# Patient Record
Sex: MALE | Race: WHITE | Employment: FULL TIME | URBAN - METROPOLITAN AREA
[De-identification: names, ages, dates, MRNs, and addresses within clinical notes are randomized per-mention and may not be internally consistent; named-entity substitution may affect disease eponyms.]

---

## 2017-06-05 PROBLEM — K51.30: Status: ACTIVE | Noted: 2017-06-05

## 2018-03-24 ENCOUNTER — APPOINTMENT (OUTPATIENT)
Dept: CT IMAGING | Age: 58
End: 2018-03-24
Attending: EMERGENCY MEDICINE
Payer: COMMERCIAL

## 2018-03-24 ENCOUNTER — HOSPITAL ENCOUNTER (EMERGENCY)
Age: 58
Discharge: HOME | End: 2018-03-24
Attending: EMERGENCY MEDICINE
Payer: COMMERCIAL

## 2018-03-24 VITALS
OXYGEN SATURATION: 99 % | BODY MASS INDEX: 31.1 KG/M2 | HEIGHT: 69 IN | RESPIRATION RATE: 16 BRPM | SYSTOLIC BLOOD PRESSURE: 178 MMHG | WEIGHT: 210 LBS | HEART RATE: 85 BPM | TEMPERATURE: 98 F | DIASTOLIC BLOOD PRESSURE: 100 MMHG

## 2018-03-24 DIAGNOSIS — S30.1XXA ABDOMINAL WALL HEMATOMA, INITIAL ENCOUNTER: Primary | ICD-10-CM

## 2018-03-24 LAB
ALBUMIN SERPL-MCNC: 3.5 G/DL (ref 3.5–4.8)
ALP LIVER SERPL-CCNC: 133 U/L (ref 45–117)
ALT SERPL-CCNC: 19 U/L (ref 17–63)
ANTIBODY SCREEN: NEGATIVE
APTT PPP: 28.4 SECONDS (ref 25–34)
AST SERPL-CCNC: 15 U/L (ref 15–41)
BASOPHILS # BLD AUTO: 0.03 X10(3) UL (ref 0–0.1)
BASOPHILS NFR BLD AUTO: 0.3 %
BILIRUB SERPL-MCNC: 0.6 MG/DL (ref 0.1–2)
BUN BLD-MCNC: 22 MG/DL (ref 8–20)
CALCIUM BLD-MCNC: 8.3 MG/DL (ref 8.3–10.3)
CHLORIDE: 105 MMOL/L (ref 101–111)
CO2: 27 MMOL/L (ref 22–32)
CREAT BLD-MCNC: 0.84 MG/DL (ref 0.7–1.3)
EOSINOPHIL # BLD AUTO: 0.26 X10(3) UL (ref 0–0.3)
EOSINOPHIL NFR BLD AUTO: 2.6 %
ERYTHROCYTE [DISTWIDTH] IN BLOOD BY AUTOMATED COUNT: 13.6 % (ref 11.5–16)
GLUCOSE BLD-MCNC: 85 MG/DL (ref 70–99)
HCT VFR BLD AUTO: 39.1 % (ref 37–53)
HGB BLD-MCNC: 12.8 G/DL (ref 13–17)
IMMATURE GRANULOCYTE COUNT: 0.03 X10(3) UL (ref 0–1)
IMMATURE GRANULOCYTE RATIO %: 0.3 %
INR BLD: 1.09 (ref 0.89–1.12)
LYMPHOCYTES # BLD AUTO: 2.5 X10(3) UL (ref 0.9–4)
LYMPHOCYTES NFR BLD AUTO: 24.8 %
M PROTEIN MFR SERPL ELPH: 6.8 G/DL (ref 6.1–8.3)
MCH RBC QN AUTO: 27.7 PG (ref 27–33.2)
MCHC RBC AUTO-ENTMCNC: 32.7 G/DL (ref 31–37)
MCV RBC AUTO: 84.6 FL (ref 80–99)
MONOCYTES # BLD AUTO: 1.07 X10(3) UL (ref 0.1–1)
MONOCYTES NFR BLD AUTO: 10.6 %
NEUTROPHIL ABS PRELIM: 6.19 X10 (3) UL (ref 1.3–6.7)
NEUTROPHILS # BLD AUTO: 6.19 X10(3) UL (ref 1.3–6.7)
NEUTROPHILS NFR BLD AUTO: 61.4 %
PLATELET # BLD AUTO: 265 10(3)UL (ref 150–450)
POTASSIUM SERPL-SCNC: 3.9 MMOL/L (ref 3.6–5.1)
PSA SERPL DL<=0.01 NG/ML-MCNC: 13.8 SECONDS (ref 11.8–14.1)
RBC # BLD AUTO: 4.62 X10(6)UL (ref 4.3–5.7)
RED CELL DISTRIBUTION WIDTH-SD: 42 FL (ref 35.1–46.3)
RH BLOOD TYPE: POSITIVE
SODIUM SERPL-SCNC: 138 MMOL/L (ref 136–144)
WBC # BLD AUTO: 10.1 X10(3) UL (ref 4–13)

## 2018-03-24 PROCEDURE — 99284 EMERGENCY DEPT VISIT MOD MDM: CPT

## 2018-03-24 PROCEDURE — 86850 RBC ANTIBODY SCREEN: CPT | Performed by: EMERGENCY MEDICINE

## 2018-03-24 PROCEDURE — 85730 THROMBOPLASTIN TIME PARTIAL: CPT | Performed by: EMERGENCY MEDICINE

## 2018-03-24 PROCEDURE — 96360 HYDRATION IV INFUSION INIT: CPT

## 2018-03-24 PROCEDURE — 85025 COMPLETE CBC W/AUTO DIFF WBC: CPT | Performed by: EMERGENCY MEDICINE

## 2018-03-24 PROCEDURE — 74177 CT ABD & PELVIS W/CONTRAST: CPT | Performed by: EMERGENCY MEDICINE

## 2018-03-24 PROCEDURE — 96361 HYDRATE IV INFUSION ADD-ON: CPT

## 2018-03-24 PROCEDURE — 80053 COMPREHEN METABOLIC PANEL: CPT | Performed by: EMERGENCY MEDICINE

## 2018-03-24 PROCEDURE — 85610 PROTHROMBIN TIME: CPT | Performed by: EMERGENCY MEDICINE

## 2018-03-24 PROCEDURE — 86901 BLOOD TYPING SEROLOGIC RH(D): CPT | Performed by: EMERGENCY MEDICINE

## 2018-03-24 PROCEDURE — 86900 BLOOD TYPING SEROLOGIC ABO: CPT | Performed by: EMERGENCY MEDICINE

## 2018-03-24 RX ORDER — SODIUM CHLORIDE 9 MG/ML
INJECTION, SOLUTION INTRAVENOUS ONCE
Status: COMPLETED | OUTPATIENT
Start: 2018-03-24 | End: 2018-03-24

## 2018-03-24 NOTE — ED PROVIDER NOTES
Patient Seen in: [facility] [place]    History   Patient presents with:  Abdomen/Flank Pain (GI/)    Stated Complaint: left side abd hematoma since tuesday    HPI    Patient is a 77-year-old male who presents emergency room with (36.7 °C) (Temporal)   Resp 16   Ht 175.3 cm (5' 9\")   Wt 95.3 kg   SpO2 99%   BMI 31.01 kg/m²         Physical Exam  GENERAL: Well-developed, well-nourished male sitting up breathing easily in no apparent distress. Patient is nontoxic in appearance. Sunita WAYNE validated against 0.3-0.7 heparin anti-Xa units/mL. CBC WITH DIFFERENTIAL WITH PLATELET    Narrative: The following orders were created for panel order CBC WITH DIFFERENTIAL WITH PLATELET.   Procedure                               Abnormality work drawn including a CBC, chemistries, BUN creatinine, and blood sugar which shows evidence of   a hemoglobin of 12.8 with nothing old to compare with. Patient electrolytes are unremarkable. Patient's coags are unremarkable.   The patient had CT scan of

## 2018-10-08 PROBLEM — K26.0 ACUTE DUODENAL ULCER WITH BLEEDING: Status: ACTIVE | Noted: 2018-09-26

## 2018-10-31 PROBLEM — I42.0 DILATED CARDIOMYOPATHY (HCC): Status: ACTIVE | Noted: 2018-10-31

## 2018-10-31 PROBLEM — I49.3 PVC'S (PREMATURE VENTRICULAR CONTRACTIONS): Status: ACTIVE | Noted: 2018-10-31

## 2018-10-31 PROBLEM — I48.0 PAROXYSMAL ATRIAL FIBRILLATION (HCC): Status: ACTIVE | Noted: 2018-10-31

## 2018-12-27 ENCOUNTER — APPOINTMENT (OUTPATIENT)
Dept: CT IMAGING | Facility: HOSPITAL | Age: 58
DRG: 917 | End: 2018-12-27
Attending: EMERGENCY MEDICINE
Payer: COMMERCIAL

## 2018-12-27 ENCOUNTER — HOSPITAL ENCOUNTER (INPATIENT)
Facility: HOSPITAL | Age: 58
LOS: 7 days | Discharge: HOME | DRG: 917 | End: 2019-01-03
Attending: EMERGENCY MEDICINE | Admitting: HOSPITALIST
Payer: COMMERCIAL

## 2018-12-27 DIAGNOSIS — T51.2X1A ISOPROPYL ALCOHOL POISONING: Primary | ICD-10-CM

## 2018-12-27 DIAGNOSIS — I42.0 DILATED CARDIOMYOPATHY (HCC): ICD-10-CM

## 2018-12-27 DIAGNOSIS — R41.82 ALTERED MENTAL STATUS, UNSPECIFIED ALTERED MENTAL STATUS TYPE: ICD-10-CM

## 2018-12-27 DIAGNOSIS — I10 ESSENTIAL HYPERTENSION: ICD-10-CM

## 2018-12-27 DIAGNOSIS — I48.0 PAROXYSMAL ATRIAL FIBRILLATION (HCC): ICD-10-CM

## 2018-12-27 DIAGNOSIS — N28.9 RENAL INSUFFICIENCY: ICD-10-CM

## 2018-12-27 DIAGNOSIS — I49.3 PVC'S (PREMATURE VENTRICULAR CONTRACTIONS): ICD-10-CM

## 2018-12-27 LAB
ALBUMIN SERPL-MCNC: 3.3 G/DL (ref 3.1–4.5)
ALBUMIN/GLOB SERPL: 1 {RATIO} (ref 1–2)
ALP LIVER SERPL-CCNC: 91 U/L (ref 45–117)
ALT SERPL-CCNC: 27 U/L (ref 17–63)
AMMONIA PLAS-MCNC: 12 UMOL/L (ref 11–32)
ANION GAP SERPL CALC-SCNC: 8 MMOL/L (ref 0–18)
APAP SERPL-MCNC: <2 UG/ML (ref ?–2)
APTT PPP: 29.2 SECONDS (ref 26.1–34.6)
AST SERPL-CCNC: 19 U/L (ref 15–41)
BASOPHILS # BLD AUTO: 0.03 X10(3) UL (ref 0–0.1)
BASOPHILS NFR BLD AUTO: 0.3 %
BILIRUB SERPL-MCNC: 0.2 MG/DL (ref 0.1–2)
BUN BLD-MCNC: 8 MG/DL (ref 8–20)
BUN/CREAT SERPL: 2.9 (ref 10–20)
CALCIUM BLD-MCNC: 7.8 MG/DL (ref 8.3–10.3)
CHLORIDE SERPL-SCNC: 117 MMOL/L (ref 101–111)
CK SERPL-CCNC: 37 IU/L (ref 39–308)
CO2 SERPL-SCNC: 23 MMOL/L (ref 22–32)
CREAT BLD-MCNC: 2.8 MG/DL (ref 0.7–1.3)
EOSINOPHIL # BLD AUTO: 0.07 X10(3) UL (ref 0–0.3)
EOSINOPHIL NFR BLD AUTO: 0.6 %
ERYTHROCYTE [DISTWIDTH] IN BLOOD BY AUTOMATED COUNT: 18.6 % (ref 11.5–16)
ETHYL ALCOHOL: <3 MG/DL (ref ?–3)
GLOBULIN PLAS-MCNC: 3.3 G/DL (ref 2.8–4.4)
GLUCOSE BLD-MCNC: 121 MG/DL (ref 70–99)
HAV IGM SER QL: 2.5 MG/DL (ref 1.8–2.5)
HCT VFR BLD AUTO: 38.1 % (ref 37–53)
HGB BLD-MCNC: 11.7 G/DL (ref 13–17)
IMMATURE GRANULOCYTE COUNT: 0.07 X10(3) UL (ref 0–1)
IMMATURE GRANULOCYTE RATIO %: 0.6 %
INR BLD: 1.01 (ref 0.9–1.1)
LYMPHOCYTES # BLD AUTO: 3.94 X10(3) UL (ref 0.9–4)
LYMPHOCYTES NFR BLD AUTO: 34.7 %
M PROTEIN MFR SERPL ELPH: 6.6 G/DL (ref 6.4–8.2)
MCH RBC QN AUTO: 22.2 PG (ref 27–33.2)
MCHC RBC AUTO-ENTMCNC: 30.7 G/DL (ref 31–37)
MCV RBC AUTO: 72.2 FL (ref 80–99)
MONOCYTES # BLD AUTO: 0.64 X10(3) UL (ref 0.1–1)
MONOCYTES NFR BLD AUTO: 5.6 %
NEUTROPHIL ABS PRELIM: 6.59 X10 (3) UL (ref 1.3–6.7)
NEUTROPHILS # BLD AUTO: 6.59 X10(3) UL (ref 1.3–6.7)
NEUTROPHILS NFR BLD AUTO: 58.2 %
OSMOLALITY SERPL CALC.SUM OF ELEC: 306 MOSM/KG (ref 275–295)
OSMOLALITY SERUM: 353 MOSM/KG (ref 280–300)
PLATELET # BLD AUTO: 521 10(3)UL (ref 150–450)
POTASSIUM SERPL-SCNC: 3.2 MMOL/L (ref 3.6–5.1)
PSA SERPL DL<=0.01 NG/ML-MCNC: 13.7 SECONDS (ref 12.4–14.7)
RBC # BLD AUTO: 5.28 X10(6)UL (ref 4.3–5.7)
RED CELL DISTRIBUTION WIDTH-SD: 45.6 FL (ref 35.1–46.3)
SALICYLATES SERPL-MCNC: <1.7 MG/DL (ref ?–1.7)
SODIUM SERPL-SCNC: 148 MMOL/L (ref 136–144)
WBC # BLD AUTO: 11.3 X10(3) UL (ref 4–13)

## 2018-12-27 PROCEDURE — 99223 1ST HOSP IP/OBS HIGH 75: CPT | Performed by: HOSPITALIST

## 2018-12-27 PROCEDURE — 99291 CRITICAL CARE FIRST HOUR: CPT | Performed by: NURSE PRACTITIONER

## 2018-12-27 PROCEDURE — 70450 CT HEAD/BRAIN W/O DYE: CPT | Performed by: EMERGENCY MEDICINE

## 2018-12-27 RX ORDER — CLONIDINE 0.1 MG/24H
1 PATCH, EXTENDED RELEASE TRANSDERMAL WEEKLY
Status: ON HOLD | COMMUNITY
End: 2018-12-28 | Stop reason: CLARIF

## 2018-12-27 RX ORDER — LORAZEPAM 2 MG/ML
2 INJECTION INTRAMUSCULAR EVERY 30 MIN PRN
Status: DISCONTINUED | OUTPATIENT
Start: 2018-12-27 | End: 2019-01-01

## 2018-12-27 RX ORDER — POTASSIUM CHLORIDE 20 MEQ/1
40 TABLET, EXTENDED RELEASE ORAL EVERY 4 HOURS
Status: DISCONTINUED | OUTPATIENT
Start: 2018-12-27 | End: 2018-12-28

## 2018-12-27 RX ORDER — HEPARIN SODIUM 5000 [USP'U]/ML
5000 INJECTION, SOLUTION INTRAVENOUS; SUBCUTANEOUS EVERY 8 HOURS SCHEDULED
Status: DISCONTINUED | OUTPATIENT
Start: 2018-12-27 | End: 2019-01-02

## 2018-12-27 RX ORDER — DEXMEDETOMIDINE HYDROCHLORIDE 4 UG/ML
INJECTION, SOLUTION INTRAVENOUS CONTINUOUS PRN
Status: DISCONTINUED | OUTPATIENT
Start: 2018-12-27 | End: 2019-01-01

## 2018-12-27 RX ORDER — ONDANSETRON 2 MG/ML
4 INJECTION INTRAMUSCULAR; INTRAVENOUS EVERY 6 HOURS PRN
Status: DISCONTINUED | OUTPATIENT
Start: 2018-12-27 | End: 2019-01-04

## 2018-12-27 RX ORDER — SODIUM CHLORIDE 9 MG/ML
INJECTION, SOLUTION INTRAVENOUS CONTINUOUS
Status: DISCONTINUED | OUTPATIENT
Start: 2018-12-27 | End: 2018-12-28

## 2018-12-27 RX ORDER — LORAZEPAM 2 MG/ML
1 INJECTION INTRAMUSCULAR
Status: DISCONTINUED | OUTPATIENT
Start: 2018-12-27 | End: 2019-01-01

## 2018-12-27 RX ORDER — CLONIDINE HYDROCHLORIDE 0.1 MG/1
0.1 TABLET ORAL DAILY PRN
Status: ON HOLD | COMMUNITY
End: 2019-01-02

## 2018-12-27 RX ORDER — LORAZEPAM 2 MG/ML
1 INJECTION INTRAMUSCULAR ONCE
Status: COMPLETED | OUTPATIENT
Start: 2018-12-27 | End: 2018-12-27

## 2018-12-27 RX ORDER — LORAZEPAM 2 MG/ML
4 INJECTION INTRAMUSCULAR
Status: DISCONTINUED | OUTPATIENT
Start: 2018-12-27 | End: 2019-01-01

## 2018-12-27 RX ORDER — METOCLOPRAMIDE HYDROCHLORIDE 5 MG/ML
5 INJECTION INTRAMUSCULAR; INTRAVENOUS EVERY 8 HOURS PRN
Status: DISCONTINUED | OUTPATIENT
Start: 2018-12-27 | End: 2019-01-02

## 2018-12-27 RX ORDER — LORAZEPAM 2 MG/ML
3 INJECTION INTRAMUSCULAR EVERY 30 MIN PRN
Status: DISCONTINUED | OUTPATIENT
Start: 2018-12-27 | End: 2019-01-01

## 2018-12-27 NOTE — ED INITIAL ASSESSMENT (HPI)
Patient to ED with wife and friend. Patient  has been battling his sobriety since coming off Lyrica in May. This is the 4th or 5th time per wife, was going to start IOP in [place] tomorrow.     Today his wife just felt like something was off, he was sl

## 2018-12-28 LAB
ALBUMIN SERPL-MCNC: 3 G/DL (ref 3.1–4.5)
ALBUMIN/GLOB SERPL: 1 {RATIO} (ref 1–2)
ALP LIVER SERPL-CCNC: 80 U/L (ref 45–117)
ALT SERPL-CCNC: 21 U/L (ref 17–63)
AMPHET UR QL SCN: NEGATIVE
ANION GAP SERPL CALC-SCNC: 7 MMOL/L (ref 0–18)
ANION GAP SERPL CALC-SCNC: 7 MMOL/L (ref 0–18)
AST SERPL-CCNC: 14 U/L (ref 15–41)
ATRIAL RATE: 94 BPM
BARBITURATES UR QL SCN: NEGATIVE
BENZODIAZ UR QL SCN: NEGATIVE
BILIRUB SERPL-MCNC: 0.2 MG/DL (ref 0.1–2)
BILIRUB UR QL STRIP.AUTO: NEGATIVE
BUN BLD-MCNC: 7 MG/DL (ref 8–20)
BUN BLD-MCNC: 7 MG/DL (ref 8–20)
BUN/CREAT SERPL: 2.8 (ref 10–20)
BUN/CREAT SERPL: 3.4 (ref 10–20)
CALCIUM BLD-MCNC: 7.6 MG/DL (ref 8.3–10.3)
CALCIUM BLD-MCNC: 7.9 MG/DL (ref 8.3–10.3)
CHLORIDE SERPL-SCNC: 118 MMOL/L (ref 101–111)
CHLORIDE SERPL-SCNC: 118 MMOL/L (ref 101–111)
CLARITY UR REFRACT.AUTO: CLEAR
CO2 SERPL-SCNC: 23 MMOL/L (ref 22–32)
CO2 SERPL-SCNC: 23 MMOL/L (ref 22–32)
COCAINE UR QL: NEGATIVE
COLOR UR AUTO: YELLOW
CREAT BLD-MCNC: 2.05 MG/DL (ref 0.7–1.3)
CREAT BLD-MCNC: 2.5 MG/DL (ref 0.7–1.3)
ERYTHROCYTE [DISTWIDTH] IN BLOOD BY AUTOMATED COUNT: 18.5 % (ref 11.5–16)
ETHANOL UR-MCNC: NEGATIVE MG/DL
GLOBULIN PLAS-MCNC: 3 G/DL (ref 2.8–4.4)
GLUCOSE BLD-MCNC: 105 MG/DL (ref 70–99)
GLUCOSE BLD-MCNC: 96 MG/DL (ref 70–99)
GLUCOSE UR STRIP.AUTO-MCNC: NEGATIVE MG/DL
HAV IGM SER QL: 2.6 MG/DL (ref 1.8–2.5)
HAV IGM SER QL: NONREACTIVE
HBV CORE IGM SER QL: NONREACTIVE
HBV SURFACE AG SERPL QL IA: NONREACTIVE
HCT VFR BLD AUTO: 34.7 % (ref 37–53)
HCV AB SERPL QL IA: NONREACTIVE
HGB BLD-MCNC: 10.3 G/DL (ref 13–17)
HYALINE CASTS #/AREA URNS AUTO: PRESENT /LPF
KETONES UR STRIP.AUTO-MCNC: 20 MG/DL
LEUKOCYTE ESTERASE UR QL STRIP.AUTO: NEGATIVE
M PROTEIN MFR SERPL ELPH: 6 G/DL (ref 6.4–8.2)
MCH RBC QN AUTO: 21.9 PG (ref 27–33.2)
MCHC RBC AUTO-ENTMCNC: 29.7 G/DL (ref 31–37)
MCV RBC AUTO: 73.8 FL (ref 80–99)
NITRITE UR QL STRIP.AUTO: NEGATIVE
OSMOLALITY SERPL CALC.SUM OF ELEC: 304 MOSM/KG (ref 275–295)
OSMOLALITY SERPL CALC.SUM OF ELEC: 304 MOSM/KG (ref 275–295)
OSMOLALITY SERUM: 331 MOSM/KG (ref 280–300)
OSMOLALITY SERUM: 339 MOSM/KG (ref 280–300)
P AXIS: 66 DEGREES
P-R INTERVAL: 122 MS
PCP URINE: NEGATIVE
PH UR STRIP.AUTO: 5 [PH] (ref 4.5–8)
PLATELET # BLD AUTO: 403 10(3)UL (ref 150–450)
POTASSIUM SERPL-SCNC: 3.2 MMOL/L (ref 3.6–5.1)
POTASSIUM SERPL-SCNC: 3.6 MMOL/L (ref 3.6–5.1)
PROT UR STRIP.AUTO-MCNC: 30 MG/DL
Q-T INTERVAL: 378 MS
QRS DURATION: 96 MS
QTC CALCULATION (BEZET): 472 MS
R AXIS: -32 DEGREES
RAPID HIV: NONREACTIVE
RBC # BLD AUTO: 4.7 X10(6)UL (ref 4.3–5.7)
RBC UR QL AUTO: NEGATIVE
RED CELL DISTRIBUTION WIDTH-SD: 47 FL (ref 35.1–46.3)
SODIUM SERPL-SCNC: 148 MMOL/L (ref 136–144)
SODIUM SERPL-SCNC: 148 MMOL/L (ref 136–144)
SP GR UR STRIP.AUTO: 1.02 (ref 1–1.03)
T AXIS: 195 DEGREES
UROBILINOGEN UR STRIP.AUTO-MCNC: <2 MG/DL
VENTRICULAR RATE: 94 BPM
WBC # BLD AUTO: 8.6 X10(3) UL (ref 4–13)

## 2018-12-28 PROCEDURE — 99233 SBSQ HOSP IP/OBS HIGH 50: CPT | Performed by: HOSPITALIST

## 2018-12-28 PROCEDURE — 99255 IP/OBS CONSLTJ NEW/EST HI 80: CPT | Performed by: INTERNAL MEDICINE

## 2018-12-28 RX ORDER — MORPHINE SULFATE 4 MG/ML
2 INJECTION, SOLUTION INTRAMUSCULAR; INTRAVENOUS EVERY 2 HOUR PRN
Status: DISCONTINUED | OUTPATIENT
Start: 2018-12-28 | End: 2018-12-29

## 2018-12-28 RX ORDER — FAMOTIDINE 40 MG/1
40 TABLET, FILM COATED ORAL DAILY
COMMUNITY

## 2018-12-28 RX ORDER — LORAZEPAM 2 MG/ML
1 INJECTION INTRAMUSCULAR EVERY 30 MIN PRN
Status: DISCONTINUED | OUTPATIENT
Start: 2018-12-28 | End: 2018-12-28

## 2018-12-28 RX ORDER — POTASSIUM CHLORIDE 29.8 MG/ML
40 INJECTION INTRAVENOUS ONCE
Status: DISCONTINUED | OUTPATIENT
Start: 2018-12-28 | End: 2018-12-28 | Stop reason: SDUPTHER

## 2018-12-28 RX ORDER — LORAZEPAM 2 MG/ML
3 INJECTION INTRAMUSCULAR
Status: DISCONTINUED | OUTPATIENT
Start: 2018-12-28 | End: 2018-12-28

## 2018-12-28 RX ORDER — LORAZEPAM 2 MG/ML
2 INJECTION INTRAMUSCULAR
Status: DISCONTINUED | OUTPATIENT
Start: 2018-12-28 | End: 2018-12-28

## 2018-12-28 RX ORDER — LORAZEPAM 2 MG/ML
4 INJECTION INTRAMUSCULAR EVERY 10 MIN PRN
Status: DISCONTINUED | OUTPATIENT
Start: 2018-12-28 | End: 2018-12-28

## 2018-12-28 RX ORDER — SODIUM CHLORIDE 450 MG/100ML
INJECTION, SOLUTION INTRAVENOUS CONTINUOUS
Status: DISCONTINUED | OUTPATIENT
Start: 2018-12-28 | End: 2018-12-31

## 2018-12-28 RX ORDER — CHLORDIAZEPOXIDE HYDROCHLORIDE 10 MG/1
10 CAPSULE, GELATIN COATED ORAL 3 TIMES DAILY
Status: DISCONTINUED | OUTPATIENT
Start: 2018-12-28 | End: 2018-12-29

## 2018-12-28 RX ORDER — MORPHINE SULFATE 4 MG/ML
1 INJECTION, SOLUTION INTRAMUSCULAR; INTRAVENOUS EVERY 2 HOUR PRN
Status: DISCONTINUED | OUTPATIENT
Start: 2018-12-28 | End: 2018-12-29

## 2018-12-28 RX ORDER — MULTIPLE VITAMINS W/ MINERALS TAB 9MG-400MCG
1 TAB ORAL DAILY
Status: DISCONTINUED | OUTPATIENT
Start: 2018-12-28 | End: 2019-01-04

## 2018-12-28 RX ORDER — FOLIC ACID 1 MG/1
1 TABLET ORAL DAILY
Status: DISCONTINUED | OUTPATIENT
Start: 2018-12-28 | End: 2019-01-04

## 2018-12-28 RX ORDER — MELATONIN
100 DAILY
Status: DISCONTINUED | OUTPATIENT
Start: 2018-12-29 | End: 2019-01-04

## 2018-12-28 NOTE — CONSULTS
[facility]  Report of Consultation    Donanorberto Cage Patient Status:  Inpatient    9/15/1960 MRN [id number]   [facility] 4SW-A Attending Roswell Sicard, MD   Hosp Day # 1 PCP Ganesh Young MD       Assessment / Plan:    1) A Heart Disorder Father    • Hypertension Mother       reports that  has never smoked. he has never used smokeless tobacco. He reports that he drinks alcohol. He reports that he uses drugs. Drug: Cannabis.     Allergies:    Lactase                 OTHER (SEE Readings from Last 3 Encounters:  12/27/18 : 191 lb 9.3 oz  12/10/18 : 208 lb  10/31/18 : 214 lb    General: awake  HEENT: No scleral icterus, MMM  Neck: Supple, no JESS or thyromegaly  Cardiac: Regular rate and rhythm, S1, S2 normal, no murmur, rub, or gal

## 2018-12-28 NOTE — ED PROVIDER NOTES
Patient Seen in: [facility] Emergency Department    History   Patient presents with:  Alcohol Intoxication (neurologic)  Eval-P (psychiatric)    Stated Complaint: patient intoxicated, wife concerned that he drank rubbing alcohol because they *    HP Smokeless tobacco: Never Used    Alcohol use:  Yes      Alcohol/week: 0.0 oz    Drug use: Not on file      Review of Systems    Positive for stated complaint: patient intoxicated, wife concerned that he drank rubbing alcohol because they *  Other systems ar Total 7.8 (*)     Calculated Osmolality 306 (*)     GFR, Non- 24 (*)     GFR, -American 27 (*)     All other components within normal limits   OSMOLALITY, SERUM - Abnormal; Notable for the following components:    Osmolality Serum 35 with altered mental status. Patient has a history of alcohol abuse, he has been relapsing intermittently since the summer. His wife believes he has not been drinking but family friend at bedside states he has been drinking vodka recently.   This morning h altered mental status, isopropyl alcohol ingestion. This involved direct patient intervention, complex decision making, and/or extensive discussions with the patient, family, and clinical staff.         Disposition and Plan     Clinical Impression:  Isopro

## 2018-12-28 NOTE — PLAN OF CARE
APN notified of critical acetone level. Pt still stating he is seeing his grandma & she is telling him to go to heaven. Spoke at length with pts wife who states she thinks he may be manipulating to try to get ativan.  Spoke with pt at length about his quali

## 2018-12-28 NOTE — PLAN OF CARE
Pt states \" can I be honest with you? I think I feel worse from the heroin than the alcohol. \" Ciwa score is 13 but I gave Morphene instead of ativan to see if there was any relief of symptoms. Pt was also able to tell me 2 elaborate jokes.    Pt states no

## 2018-12-28 NOTE — BH PROGRESS NOTE
Went to see the pt earlier and his nurse, Navya Perez stated the pt is sleeping. She said, he appeared to be hallucinating. This liaison not able to assess the pt at this time. Navya Perez was informed of the assessment done at [facility] on 12/26/19.   The pt at

## 2018-12-28 NOTE — PLAN OF CARE
Asked pt about his heroin use. States he uses 0.5 gm dailly. Discussed the effects of this on his life. His family does not know & he does not give permission for us to tell them.  States he feels more dope sick then alcohol withdrawal. States he feel anxio

## 2018-12-28 NOTE — PROGRESS NOTES
Pharmacy Note: Renal dose adjustment for Metoclopramide (Reglan)  Zaheer Rojas has been prescribed Metoclopramide (Reglan) 10 mg every 8 hours as needed. Estimated Creatinine Clearance: 29.7 mL/min (A) (based on SCr of 2.8 mg/dL (H)).     His calculat

## 2018-12-28 NOTE — PROGRESS NOTES
[facility]  Progress Note     Dona Fauzia Patient Status:  Inpatient    9/15/1960 MRN [id number]   [facility] 4SW-A Attending Roswell Sicard, MD   Hosp Day # 1 PCP Ganesh Young MD     Chief Complaint: encephalopathy Q24H   • Heparin Sodium (Porcine)  5,000 Units Subcutaneous Q8H [address]       ASSESSMENT / PLAN:     1. Acute encephalopathy with Acute Delirium and likely Delirium Tremens  1. Due to meds and IA ingestion  2. Etoh w/d  1. CIWA  2. Add librium  3.  Isopropyl Al

## 2018-12-28 NOTE — PROGRESS NOTES
ICU  Critical Care APRN Progress Note    NAME: Dionicio Larsen - ROOM: [address] [place] [address] - MRN: [id number] - Age: 62year old - :9/15/1960    History Of Present Illness:  Dionicio Larsen is a 62year old male with PMHx significant for ETOH abuse and HTN.   He has Physical Exam:    General Appearance: Alert to person and place, hallucinating and reaching for his grandmother, tearful at times, cooperative, appears stated age  Neck: No JVD, neck supple, no adenopathy, trachea midline, no carotid bruits  Lungs: Prashant General  Proph:  -SQ heparin  -SCD  -Protonix    Dispo:   -Full code    Plan of care discussed with intensivist on-call, Dr. Shane David. A total of 35 minutes of critical care time (exclusive of billable procedures) was administered.  This involved direct

## 2018-12-28 NOTE — H&P
[facility]  History and Physical     [facility] Patient Status:  Emergency    9/15/1960 MRN [id number]   Location [facility] Attending Benita Hillman MD   Hosp Day # 0 PCP Tiffanie Reyes MD     Chief Co medications on file prior to encounter. Current Outpatient Medications on File Prior to Encounter:  CARTIA  MG Oral Capsule SR 24 Hr TK 1 C PO QD Disp:  Rfl: 0   bisoprolol-hydrochlorothiazide 5-6.25 MG Oral Tab Take 1 tablet by mouth daily.  Disp: Lab  12/27/18   1754   CK  37*       Imaging: Imaging data reviewed in Epic. ASSESSMENT / PLAN:     1. Acute encephalopathy d/t Isopropyl alcohol ingestion  2. Substance dependence disorder, alcohol  1. Admit to ICU  2. CIWA protocol  3.  CCM consult

## 2018-12-28 NOTE — PLAN OF CARE
GASTROINTESTINAL - ADULT    • Minimal or absence of nausea and vomiting Progressing        NEUROLOGICAL - ADULT    • Absence of seizures Progressing        PT ADMITTED ALERT, DISORIENTED, ORIENTED TO SELF,  HALLUCINATING, REACHING IN AIR, RESTLESS,  ANXIOU

## 2018-12-28 NOTE — CONSULTS
[address] Patient Status:  Inpatient    9/15/1960 MRN [id number]   [facility] 4SW-A Attending Javier Mcduffie MD   [facility] Day # 1 PCP Ariana Otto MD     Date of Admission: 2018  Admission Diagnosis: OTHER (SEE COMMENTS)    Comment:Pt is lactose intolerant     Social History:   reports that  has never smoked. he has never used smokeless tobacco. He reports that he drinks alcohol. He reports that he uses drugs. Drug: Cannabis.       Family History hearing changes, eye pain, tinnitus, hearing loss, sore throat, epistaxis, sinus congestion  Cardiovascular: denies chest pain, PND, palpitations, edema, orthopnea, or syncope  Respiratory: denies SOB, dyspnea on exertion, denies cough, hemoptysis, wheezin no edema                          Skin: No rashes or lesions.        Recent Labs   Lab  12/27/18   1754  12/28/18   0443   GLU  121*  105*   BUN  8  7*   CREATSERUM  2.80*  2.50*   GFRAA  27*  32*   GFRNAA  24*  27*   CA  7.8*  7.6*   ALB  3.3  3.0*   NA  1

## 2018-12-28 NOTE — PAYOR COMM NOTE
--------------  ADMISSION REVIEW     Payor: KAREN LEW  Subscriber #:  [id number]  Authorization Number: [id number]    Admit date: 12/27/18  Admit time: 2224       Patient Seen in: [facility] Emergency Department    History   Stated Complaint: patient Pulse 12/27/18 1740 96   Resp 12/27/18 1740 16   Temp 12/27/18 2014 98.7 °F (37.1 °C)   Temp src 12/27/18 2014 Temporal   SpO2 12/27/18 1740 95 %   O2 Device 12/27/18 1740 None (Room air)     Current:/81   Pulse 89   Temp 98.7   Resp 18   Ht 177.8 patient of this age. MDM   63-year-old male presenting with altered mental status. Patient has a history of alcohol abuse, he has been relapsing intermittently since the summer.   His wife believes he has not been drinking but family friend at bedside diagnosis)  Altered mental status, unspecified altered mental status type  Renal insufficiency        [facility]  History and Physical     Floresita Diaz Patient Status:  Emergency    9/15/1960 MRN [id number]   [place] [address] electrolytes  3. Monitor UOP, creatinine and electrolytes  4. Consider US  5. Nephrology consult  6. PUD  1. PPI  7. IBD  1. Resume PTA regimen tomorrow if mental status improves  8. Anemia  9. Thrombocytosis  1.  Monitor      MEDICATIONS ADMINISTERED IN [place] Route User    12/27/2018 2358 Given 40 mEq Oral Benita Belcher RN      0.45% NaCl infusion     Date Action Dose Route User    12/28/2018 0730 New Bag (none) Intravenous Sada Ramos RN      0.9%  NaCl infusion     Date Action Dose Route User

## 2018-12-29 LAB
ANION GAP SERPL CALC-SCNC: 6 MMOL/L (ref 0–18)
BASOPHILS # BLD AUTO: 0.03 X10(3) UL (ref 0–0.1)
BASOPHILS NFR BLD AUTO: 0.5 %
BUN BLD-MCNC: 5 MG/DL (ref 8–20)
BUN/CREAT SERPL: 2.8 (ref 10–20)
CALCIUM BLD-MCNC: 7.7 MG/DL (ref 8.3–10.3)
CHLORIDE SERPL-SCNC: 115 MMOL/L (ref 101–111)
CO2 SERPL-SCNC: 25 MMOL/L (ref 22–32)
CREAT BLD-MCNC: 1.76 MG/DL (ref 0.7–1.3)
EOSINOPHIL # BLD AUTO: 0.14 X10(3) UL (ref 0–0.3)
EOSINOPHIL NFR BLD AUTO: 2.3 %
ERYTHROCYTE [DISTWIDTH] IN BLOOD BY AUTOMATED COUNT: 17.7 % (ref 11.5–16)
GLUCOSE BLD-MCNC: 98 MG/DL (ref 70–99)
HAV IGM SER QL: 2 MG/DL (ref 1.8–2.5)
HCT VFR BLD AUTO: 30.3 % (ref 37–53)
HGB BLD-MCNC: 9.2 G/DL (ref 13–17)
IMMATURE GRANULOCYTE COUNT: 0.02 X10(3) UL (ref 0–1)
IMMATURE GRANULOCYTE RATIO %: 0.3 %
IRON SATURATION: 11 % (ref 20–50)
IRON: 25 UG/DL (ref 45–182)
LYMPHOCYTES # BLD AUTO: 2.05 X10(3) UL (ref 0.9–4)
LYMPHOCYTES NFR BLD AUTO: 33.3 %
MCH RBC QN AUTO: 22.5 PG (ref 27–33.2)
MCHC RBC AUTO-ENTMCNC: 30.4 G/DL (ref 31–37)
MCV RBC AUTO: 74.3 FL (ref 80–99)
MONOCYTES # BLD AUTO: 0.53 X10(3) UL (ref 0.1–1)
MONOCYTES NFR BLD AUTO: 8.6 %
NEUTROPHIL ABS PRELIM: 3.39 X10 (3) UL (ref 1.3–6.7)
NEUTROPHILS # BLD AUTO: 3.39 X10(3) UL (ref 1.3–6.7)
NEUTROPHILS NFR BLD AUTO: 55 %
OSMOLALITY SERPL CALC.SUM OF ELEC: 299 MOSM/KG (ref 275–295)
PHOSPHATE SERPL-MCNC: 3.2 MG/DL (ref 2.5–4.9)
PLATELET # BLD AUTO: 330 10(3)UL (ref 150–450)
POTASSIUM SERPL-SCNC: 3.6 MMOL/L (ref 3.6–5.1)
RBC # BLD AUTO: 4.08 X10(6)UL (ref 4.3–5.7)
RED CELL DISTRIBUTION WIDTH-SD: 46.6 FL (ref 35.1–46.3)
SODIUM SERPL-SCNC: 146 MMOL/L (ref 136–144)
TOTAL IRON BINDING CAPACITY: 238 UG/DL (ref 240–450)
TRANSFERRIN SERPL-MCNC: 160 MG/DL (ref 200–360)
WBC # BLD AUTO: 6.2 X10(3) UL (ref 4–13)

## 2018-12-29 PROCEDURE — 99233 SBSQ HOSP IP/OBS HIGH 50: CPT | Performed by: INTERNAL MEDICINE

## 2018-12-29 PROCEDURE — 99232 SBSQ HOSP IP/OBS MODERATE 35: CPT | Performed by: HOSPITALIST

## 2018-12-29 RX ORDER — CHLORDIAZEPOXIDE HYDROCHLORIDE 25 MG/1
25 CAPSULE, GELATIN COATED ORAL 3 TIMES DAILY
Status: DISCONTINUED | OUTPATIENT
Start: 2018-12-29 | End: 2019-01-01

## 2018-12-29 RX ORDER — HYDRALAZINE HYDROCHLORIDE 20 MG/ML
20 INJECTION INTRAMUSCULAR; INTRAVENOUS EVERY 4 HOURS PRN
Status: DISCONTINUED | OUTPATIENT
Start: 2018-12-29 | End: 2019-01-04

## 2018-12-29 RX ORDER — HALOPERIDOL 5 MG/ML
3 INJECTION INTRAMUSCULAR ONCE
Status: COMPLETED | OUTPATIENT
Start: 2018-12-29 | End: 2018-12-29

## 2018-12-29 RX ORDER — DICYCLOMINE HCL 20 MG
20 TABLET ORAL EVERY 4 HOURS PRN
Status: DISCONTINUED | OUTPATIENT
Start: 2018-12-29 | End: 2019-01-04

## 2018-12-29 RX ORDER — MORPHINE SULFATE 4 MG/ML
1 INJECTION, SOLUTION INTRAMUSCULAR; INTRAVENOUS EVERY 2 HOUR PRN
Status: DISCONTINUED | OUTPATIENT
Start: 2018-12-29 | End: 2019-01-01

## 2018-12-29 NOTE — BH PROGRESS NOTE
Went to see the pt. He was groaning and c/o aches all over his body, nauseated, headache, weak and hallucinating. He said, he sees and hears his grandmother. Pt admits to using heroin daily.   He states he uses 1gm per day which he states equals to 100 d

## 2018-12-29 NOTE — PLAN OF CARE
ANXIETY    • Will report anxiety at manageable levels Progressing        DRUG ABUSE/DETOX    • Will have no detox symptoms and will verbalize plan for changing drug-related behavior Progressing          Received pt alert and oriented.  [facility] protocol for ETO

## 2018-12-29 NOTE — PROGRESS NOTES
[facility]  Progress Note     Floresita Diaz Patient Status:  Inpatient    9/15/1960 MRN [id number]   [facility] 4SW-A Attending Patrecia Cooks, MD   Hosp Day # 2 PCP Brianna Felix MD     Chief Complaint: encephalopathy 1754   CK  37*            Imaging: Imaging data reviewed in Epic.     Medications:   • ChlordiazePOXIDE HCl  25 mg Oral TID   • Thiamine HCl  100 mg Oral Daily   • multivitamin with minerals  1 tablet Oral Daily   • folic acid  1 mg Oral Daily   • pantopraz

## 2018-12-29 NOTE — PROGRESS NOTES
[facility]  Nephrology Progress Note    Roque Echgoldie Patient Status:  Inpatient    9/15/1960 MRN [id number]   [facility] 4SW-A Attending Sophie Roberts MD   Hosp Day # 2 PCP Shirley Bosworth, MD       SUBJECTIVE:  Eddie but 3. 2   GLU  121*  105*  96  98       Recent Labs   Lab  12/27/18   1754  12/28/18   0443   ALT  27  21   AST  19  14*   ALB  3.3  3.0*       No results for input(s): PGLU in the last 168 hours. Meds:     Current Facility-Administered Medications:   [place]

## 2018-12-29 NOTE — PROGRESS NOTES
[facility] PULMONARY/CRITICAL CARE    S: The patient is still confused, but more awake.     Meds:  • Thiamine HCl  100 mg Oral Daily   • multivitamin with minerals  1 tablet Oral Daily   • folic acid  1 mg Oral Daily   • ChlordiazePOXIDE HCl  10 mg Oral TID   • pa CREATSERUM  2.80*  2.50*  2.05*  1.76*   GFRAA  27*  32*  40*  48*   GFRNAA  24*  27*  35*  42*   CA  7.8*  7.6*  7.9*  7.7*   ALB  3.3  3.0*   --    --    NA  148*  148*  148*  146*   K  3.2*  3.2*  3.6  3.6   CL  117*  118*  118*  115*   CO2  23.0  23.

## 2018-12-29 NOTE — PLAN OF CARE
DRUG ABUSE/DETOX    • Will have no detox symptoms and will verbalize plan for changing drug-related behavior Not Progressing          NEUROLOGICAL - ADULT    • Achieves stable or improved neurological status Not Progressing    • Absence of seizures Not Pro

## 2018-12-30 ENCOUNTER — APPOINTMENT (OUTPATIENT)
Dept: GENERAL RADIOLOGY | Facility: HOSPITAL | Age: 58
DRG: 917 | End: 2018-12-30
Attending: INTERNAL MEDICINE
Payer: COMMERCIAL

## 2018-12-30 LAB
ALBUMIN SERPL-MCNC: 3 G/DL (ref 3.1–4.5)
ALBUMIN/GLOB SERPL: 1 {RATIO} (ref 1–2)
ALP LIVER SERPL-CCNC: 85 U/L (ref 45–117)
ALT SERPL-CCNC: 17 U/L (ref 17–63)
ANION GAP SERPL CALC-SCNC: 6 MMOL/L (ref 0–18)
AST SERPL-CCNC: 10 U/L (ref 15–41)
BILIRUB SERPL-MCNC: 0.3 MG/DL (ref 0.1–2)
BILIRUB UR QL STRIP.AUTO: NEGATIVE
BUN BLD-MCNC: 4 MG/DL (ref 8–20)
BUN/CREAT SERPL: 2.7 (ref 10–20)
CALCIUM BLD-MCNC: 8.3 MG/DL (ref 8.3–10.3)
CHLORIDE SERPL-SCNC: 117 MMOL/L (ref 101–111)
CLARITY UR REFRACT.AUTO: CLEAR
CO2 SERPL-SCNC: 23 MMOL/L (ref 22–32)
COLOR UR AUTO: YELLOW
CREAT BLD-MCNC: 1.48 MG/DL (ref 0.7–1.3)
ERYTHROCYTE [DISTWIDTH] IN BLOOD BY AUTOMATED COUNT: 17.6 % (ref 11.5–16)
GLOBULIN PLAS-MCNC: 3.1 G/DL (ref 2.8–4.4)
GLUCOSE BLD-MCNC: 89 MG/DL (ref 70–99)
GLUCOSE UR STRIP.AUTO-MCNC: NEGATIVE MG/DL
HCT VFR BLD AUTO: 34.6 % (ref 37–53)
HGB BLD-MCNC: 10.4 G/DL (ref 13–17)
LEUKOCYTE ESTERASE UR QL STRIP.AUTO: NEGATIVE
M PROTEIN MFR SERPL ELPH: 6.1 G/DL (ref 6.4–8.2)
MCH RBC QN AUTO: 22.4 PG (ref 27–33.2)
MCHC RBC AUTO-ENTMCNC: 30.1 G/DL (ref 31–37)
MCV RBC AUTO: 74.6 FL (ref 80–99)
NITRITE UR QL STRIP.AUTO: NEGATIVE
OSMOLALITY SERPL CALC.SUM OF ELEC: 298 MOSM/KG (ref 275–295)
PH UR STRIP.AUTO: 7 [PH] (ref 4.5–8)
PLATELET # BLD AUTO: 380 10(3)UL (ref 150–450)
POTASSIUM SERPL-SCNC: 3.3 MMOL/L (ref 3.6–5.1)
POTASSIUM SERPL-SCNC: 3.6 MMOL/L (ref 3.6–5.1)
PROT UR STRIP.AUTO-MCNC: NEGATIVE MG/DL
RBC # BLD AUTO: 4.64 X10(6)UL (ref 4.3–5.7)
RBC UR QL AUTO: NEGATIVE
RED CELL DISTRIBUTION WIDTH-SD: 46.5 FL (ref 35.1–46.3)
SODIUM SERPL-SCNC: 146 MMOL/L (ref 136–144)
SP GR UR STRIP.AUTO: 1.01 (ref 1–1.03)
UROBILINOGEN UR STRIP.AUTO-MCNC: <2 MG/DL
WBC # BLD AUTO: 6.6 X10(3) UL (ref 4–13)

## 2018-12-30 PROCEDURE — 71045 X-RAY EXAM CHEST 1 VIEW: CPT | Performed by: INTERNAL MEDICINE

## 2018-12-30 PROCEDURE — 99233 SBSQ HOSP IP/OBS HIGH 50: CPT | Performed by: INTERNAL MEDICINE

## 2018-12-30 PROCEDURE — 99252 IP/OBS CONSLTJ NEW/EST SF 35: CPT | Performed by: OTHER

## 2018-12-30 PROCEDURE — 99233 SBSQ HOSP IP/OBS HIGH 50: CPT | Performed by: HOSPITALIST

## 2018-12-30 RX ORDER — ACETAMINOPHEN 500 MG
1000 TABLET ORAL ONCE AS NEEDED
Status: COMPLETED | OUTPATIENT
Start: 2018-12-30 | End: 2018-12-30

## 2018-12-30 RX ORDER — ARIPIPRAZOLE 15 MG/1
40 TABLET ORAL EVERY 4 HOURS
Status: COMPLETED | OUTPATIENT
Start: 2018-12-30 | End: 2018-12-30

## 2018-12-30 RX ORDER — POTASSIUM CHLORIDE 20 MEQ/1
40 TABLET, EXTENDED RELEASE ORAL EVERY 4 HOURS
Status: COMPLETED | OUTPATIENT
Start: 2018-12-31 | End: 2018-12-31

## 2018-12-30 RX ORDER — IBUPROFEN 400 MG/1
400 TABLET ORAL ONCE
Status: COMPLETED | OUTPATIENT
Start: 2018-12-30 | End: 2018-12-30

## 2018-12-30 RX ORDER — MESALAMINE 1000 MG/1
1000 SUPPOSITORY RECTAL NIGHTLY
Status: DISCONTINUED | OUTPATIENT
Start: 2018-12-30 | End: 2019-01-04

## 2018-12-30 RX ORDER — SACCHAROMYCES BOULARDII 250 MG
250 CAPSULE ORAL 2 TIMES DAILY
Status: DISCONTINUED | OUTPATIENT
Start: 2018-12-30 | End: 2018-12-30

## 2018-12-30 RX ORDER — MESALAMINE 400 MG/1
1200 CAPSULE, DELAYED RELEASE ORAL 3 TIMES DAILY
Status: DISCONTINUED | OUTPATIENT
Start: 2018-12-30 | End: 2019-01-04

## 2018-12-30 RX ORDER — FAMOTIDINE 20 MG/1
40 TABLET ORAL DAILY
Status: DISCONTINUED | OUTPATIENT
Start: 2018-12-30 | End: 2018-12-30

## 2018-12-30 RX ORDER — HALOPERIDOL 5 MG/ML
3 INJECTION INTRAMUSCULAR ONCE
Status: COMPLETED | OUTPATIENT
Start: 2018-12-30 | End: 2018-12-30

## 2018-12-30 NOTE — PLAN OF CARE
CIWA 5-21. 10mg ativan given. Agitation and anxiety, mild hallucinations  Per patient grandma and boss in the room. Haldol x1 given for agitation. Pt c/o of abd. Pain relieved with Bentyl prn   Hydralazine 20mg prn given for sbp > 160.       orientated

## 2018-12-30 NOTE — PROGRESS NOTES
[facility]  Progress Note     [facility] Patient Status:  Inpatient    9/15/1960 MRN [id number]   [facility] 4SW-A Attending Anaya Chin MD   Hosp Day # 3 PCP Sharan Amanda MD     Chief Complaint: encephalopathy 0.2   --    --   0.3   TP  6.6  6.0*   --    --   6.1*       Recent Labs   Lab  12/27/18   2252   PTP  13.7   INR  1.01       Recent Labs   Lab  12/27/18   1754   CK  37*            Imaging: Imaging data reviewed in Epic.     Medications:   • iron sucrose

## 2018-12-30 NOTE — PLAN OF CARE
ANXIETY    • Will report anxiety at manageable levels Not Progressing        DRUG ABUSE/DETOX    • Will have no detox symptoms and will verbalize plan for changing drug-related behavior Not Progressing        MUSCULOSKELETAL - ADULT    • Return mobility to

## 2018-12-30 NOTE — CONSULTS
[address]    PATIENT'S NAME: Manuel Lanier   ATTENDING PHYSICIAN: KAREN Mcqueen Sharps: Terrance Wagner MD   PATIENT ACCOUNT#:   [id number]    LOCATION:  [address] [place] [address]  MEDICAL RECORD #:   [id number]       DATE OF BIRTH:  [birth year] was sleeping, but aroused and was able to briefly talk to me about how he feels. He was aware of the location as well as the date and why he is here, namely coming off the alcohol.   When I asked in what way he feels worse, is it from coming off the [employer] that more medications, namely opiates, might have an additive effect with the benzodiazepine and contribute to more confusion, so we will continue to manage opiate withdrawal symptoms through p.r.n. medications as appropriate for the symptom such as Miguel

## 2018-12-30 NOTE — PROGRESS NOTES
[facility] PULMONARY/CRITICAL CARE    S: more awake, alert and oriented. Still with agitation and abd cramping. Fever overnight.     Meds:  • ChlordiazePOXIDE HCl  25 mg Oral TID   • Thiamine HCl  100 mg Oral Daily   • multivitamin with minerals  1 tablet Oral D 6.2  6.6   PLT  521.0*  403.0  330.0  380.0     Recent Labs   Lab  12/27/18   1754  12/28/18   0443  12/28/18   1412  12/29/18   0258  12/30/18   0452   GLU  121*  105*  96  98  89   BUN  8  7*  7*  5*  4*   CREATSERUM  2.80*  2.50*  2.05*  1.76*  1.48*

## 2018-12-31 LAB
ALBUMIN SERPL-MCNC: 3.1 G/DL (ref 3.1–4.5)
ALBUMIN/GLOB SERPL: 1 {RATIO} (ref 1–2)
ALP LIVER SERPL-CCNC: 91 U/L (ref 45–117)
ALT SERPL-CCNC: 18 U/L (ref 17–63)
ANION GAP SERPL CALC-SCNC: 7 MMOL/L (ref 0–18)
AST SERPL-CCNC: 12 U/L (ref 15–41)
BASOPHILS # BLD AUTO: 0.02 X10(3) UL (ref 0–0.1)
BASOPHILS NFR BLD AUTO: 0.4 %
BILIRUB SERPL-MCNC: 0.2 MG/DL (ref 0.1–2)
BUN BLD-MCNC: 6 MG/DL (ref 8–20)
BUN/CREAT SERPL: 4.7 (ref 10–20)
CALCIUM BLD-MCNC: 8.5 MG/DL (ref 8.3–10.3)
CHLORIDE SERPL-SCNC: 115 MMOL/L (ref 101–111)
CO2 SERPL-SCNC: 25 MMOL/L (ref 22–32)
CREAT BLD-MCNC: 1.27 MG/DL (ref 0.7–1.3)
EOSINOPHIL # BLD AUTO: 0.22 X10(3) UL (ref 0–0.3)
EOSINOPHIL NFR BLD AUTO: 4 %
ERYTHROCYTE [DISTWIDTH] IN BLOOD BY AUTOMATED COUNT: 19.1 % (ref 11.5–16)
GLOBULIN PLAS-MCNC: 3.2 G/DL (ref 2.8–4.4)
GLUCOSE BLD-MCNC: 110 MG/DL (ref 70–99)
HCT VFR BLD AUTO: 38 % (ref 37–53)
HGB BLD-MCNC: 11.5 G/DL (ref 13–17)
IMMATURE GRANULOCYTE COUNT: 0.02 X10(3) UL (ref 0–1)
IMMATURE GRANULOCYTE RATIO %: 0.4 %
LYMPHOCYTES # BLD AUTO: 1.46 X10(3) UL (ref 0.9–4)
LYMPHOCYTES NFR BLD AUTO: 26.4 %
M PROTEIN MFR SERPL ELPH: 6.3 G/DL (ref 6.4–8.2)
MCH RBC QN AUTO: 22.3 PG (ref 27–33.2)
MCHC RBC AUTO-ENTMCNC: 30.3 G/DL (ref 31–37)
MCV RBC AUTO: 73.8 FL (ref 80–99)
MONOCYTES # BLD AUTO: 0.92 X10(3) UL (ref 0.1–1)
MONOCYTES NFR BLD AUTO: 16.6 %
NEUTROPHIL ABS PRELIM: 2.9 X10 (3) UL (ref 1.3–6.7)
NEUTROPHILS # BLD AUTO: 2.9 X10(3) UL (ref 1.3–6.7)
NEUTROPHILS NFR BLD AUTO: 52.2 %
OPIATES, UR, 6-ACETYLMORPHINE: <10 NG/ML
OPIATES, URINE, CODEINE: <20 NG/ML
OPIATES, URINE, HYDROCODONE: <20 NG/ML
OPIATES, URINE, HYDROMORPHONE: <20 NG/ML
OPIATES, URINE, MORPHINE: <20 NG/ML
OPIATES, URINE, NORHYDROCODONE: <20 NG/ML
OPIATES, URINE, NOROXYCODONE: <20 NG/ML
OPIATES, URINE, NOROXYMORPHONE: <20 NG/ML
OPIATES, URINE, OXYCODONE: <20 NG/ML
OPIATES, URINE, OXYMORPHONE: <20 NG/ML
OSMOLALITY SERPL CALC.SUM OF ELEC: 302 MOSM/KG (ref 275–295)
PLATELET # BLD AUTO: 425 10(3)UL (ref 150–450)
POTASSIUM SERPL-SCNC: 4 MMOL/L (ref 3.6–5.1)
POTASSIUM SERPL-SCNC: 4.1 MMOL/L (ref 3.6–5.1)
RBC # BLD AUTO: 5.15 X10(6)UL (ref 4.3–5.7)
RED CELL DISTRIBUTION WIDTH-SD: 47.2 FL (ref 35.1–46.3)
SODIUM SERPL-SCNC: 147 MMOL/L (ref 136–144)
WBC # BLD AUTO: 5.5 X10(3) UL (ref 4–13)

## 2018-12-31 PROCEDURE — 99232 SBSQ HOSP IP/OBS MODERATE 35: CPT | Performed by: HOSPITALIST

## 2018-12-31 PROCEDURE — 99233 SBSQ HOSP IP/OBS HIGH 50: CPT | Performed by: INTERNAL MEDICINE

## 2018-12-31 RX ORDER — CLONIDINE HYDROCHLORIDE 0.1 MG/1
0.1 TABLET ORAL DAILY
Status: DISCONTINUED | OUTPATIENT
Start: 2018-12-31 | End: 2018-12-31

## 2018-12-31 RX ORDER — HYDROCHLOROTHIAZIDE 25 MG/1
6.25 TABLET ORAL DAILY
Status: DISCONTINUED | OUTPATIENT
Start: 2018-12-31 | End: 2018-12-31

## 2018-12-31 RX ORDER — BISOPROLOL FUMARATE AND HYDROCHLOROTHIAZIDE 6.25; 5 MG/1; MG/1
1 TABLET ORAL DAILY
Status: DISCONTINUED | OUTPATIENT
Start: 2018-12-31 | End: 2018-12-31 | Stop reason: SDUPTHER

## 2018-12-31 RX ORDER — DILTIAZEM HYDROCHLORIDE 120 MG/1
120 CAPSULE, EXTENDED RELEASE ORAL DAILY
Status: DISCONTINUED | OUTPATIENT
Start: 2018-12-31 | End: 2019-01-04

## 2018-12-31 RX ORDER — LISINOPRIL 10 MG/1
20 TABLET ORAL DAILY
Status: DISCONTINUED | OUTPATIENT
Start: 2018-12-31 | End: 2019-01-04

## 2018-12-31 RX ORDER — ACETAMINOPHEN 325 MG/1
650 TABLET ORAL EVERY 6 HOURS PRN
Status: DISCONTINUED | OUTPATIENT
Start: 2018-12-31 | End: 2019-01-04

## 2018-12-31 NOTE — PROGRESS NOTES
[facility]  Progress Note     Maya Lambert Patient Status:  Inpatient    9/15/1960 MRN [id number]   [facility] 4SW-A Attending Royal Cira MD   Hosp Day # 4 PCP Gilbert Steen MD     Chief Complaint: encephalopathy 115*   --    CO2  23.0   < >  25.0  23.0   --   25.0   --    ALKPHO  80   --    --   85   --   91   --    AST  14*   --    --   10*   --   12*   --    ALT  21   --    --   17   --   18   --    BILT  0.2   --    --   0.3   --   0.2   --    TP  6.0*   -- examined. Gen: NAD  CVS: s1s2  Resp: CTA  Abd: soft    -as above  -fevers likely reactive/atelectasis.  Curt Holloway MD

## 2018-12-31 NOTE — BH PROGRESS NOTE
Went to see the pt and he was acting has if he was seeing things. He was reaching out in the air. Tried to talk with him about cd tx. He said, he does want an outpt program after he leaves here.   He already seen [facility] on 12/26/18 and had an assessment don

## 2018-12-31 NOTE — PROGRESS NOTES
[facility]  Nephrology Progress Note    Doy Check Attending:  Milly Almaguer MD       Assessment and Plan:    1) AL- due to dehydration + ACE-I / diuretic + NSAIDS leading to glomerular hypoperfusion and prerenal azotemia / ATN- improving susan CO2 25.0 12/31/2018     12/31/2018    CA 8.5 12/31/2018    ALB 3.1 12/31/2018    ALKPHO 91 12/31/2018    BILT 0.2 12/31/2018    TP 6.3 12/31/2018    AST 12 12/31/2018    ALT 18 12/31/2018       Imaging: All imaging studies reviewed.     Meds: bedside.           Sharonda Dobbins  12/31/2018  8:38 AM

## 2018-12-31 NOTE — PROGRESS NOTES
Pulmonary Progress Note        NAME: Dona Cage - ROOM: [address] [place] [address]- - MRN: [id number] - Age: 62year old - : 9/15/1960        Last 24hrs: No events overnight, notes symptoms of withdrawal this AM- headache/nausea/diarrhea    OBJECTIVE:   18  000 1412  12/29/18   0258  12/30/18   0452  12/30/18   2224  12/31/18   0434   NA  148*  146*  146*   --   147*   K  3.6  3.6  3.3*  3.6  4.1   CL  118*  115*  117*   --   115*   CO2  23.0  25.0  23.0   --   25.0   BUN  7*  5*  4*   --   6*   CA  7.9*  7.7*  8

## 2018-12-31 NOTE — PLAN OF CARE
VSS. Aox4. CIWA 4-22. 22mg ativan given. Bentyl given x1 with relief. Low grade temps overnight.   k replaced per protocol. Will cont.  To monitor     ANXIETY    • Will report anxiety at manageable levels Not Progressing        DRUG ABUSE/DETOX    •

## 2018-12-31 NOTE — PAYOR COMM NOTE
--------------  CONTINUED STAY REVIEW    Payor: KAREN LEW  Subscriber #:  [id number]  Authorization Number: [id number]    12/31: Remains in ICU     S: Patient reports abdominal pain from IBD, nausea, HA. Currently calm/coopertaive.   RN reports he require neg  5. PUD on PPI  6. HTN  1. Lisinopril/cardizem resumed  7. IBD  1. home mesalamine  8. Anemia, stable  1. Given iron IV  9. Hypernatremia, slight increase, monitor  10.  Hypokalemia, resolved     Monitor in ICU until ativan needs decrease, appreciate ps Route User    12/31/2018 9248 Given 6.25 mg Oral FabsiAlice funes, RN      lisinopril (PRINIVIL,ZESTRIL) tab 20 mg     Date Action Dose Route User    12/31/2018 1045 Given 20 mg Oral FabsitsAlice, RN      LORazepam (ATIVAN) injection 2 mg     Date Action D Route User    12/31/2018 0432 Given 40 mEq Oral Rajan Demark, [employer]    12/31/2018 0012 Given 40 mEq Oral Rajan Demark, [employer]      0.45% NaCl infusion     Date Action Dose Route User    12/31/2018 7738 New Bag (none) Intravenous Rajan Demark,

## 2019-01-01 LAB
ANION GAP SERPL CALC-SCNC: 11 MMOL/L (ref 0–18)
BUN BLD-MCNC: 10 MG/DL (ref 8–20)
BUN/CREAT SERPL: 8.3 (ref 10–20)
CALCIUM BLD-MCNC: 8.6 MG/DL (ref 8.3–10.3)
CHLORIDE SERPL-SCNC: 111 MMOL/L (ref 101–111)
CO2 SERPL-SCNC: 19 MMOL/L (ref 22–32)
CREAT BLD-MCNC: 1.2 MG/DL (ref 0.7–1.3)
GLUCOSE BLD-MCNC: 94 MG/DL (ref 70–99)
OSMOLALITY SERPL CALC.SUM OF ELEC: 291 MOSM/KG (ref 275–295)
POTASSIUM SERPL-SCNC: 4 MMOL/L (ref 3.6–5.1)
SODIUM SERPL-SCNC: 141 MMOL/L (ref 136–144)

## 2019-01-01 PROCEDURE — 99232 SBSQ HOSP IP/OBS MODERATE 35: CPT | Performed by: INTERNAL MEDICINE

## 2019-01-01 PROCEDURE — 99232 SBSQ HOSP IP/OBS MODERATE 35: CPT | Performed by: HOSPITALIST

## 2019-01-01 RX ORDER — LORAZEPAM 0.5 MG/1
0.5 TABLET ORAL EVERY 4 HOURS PRN
Status: DISCONTINUED | OUTPATIENT
Start: 2019-01-01 | End: 2019-01-04

## 2019-01-01 RX ORDER — MORPHINE SULFATE 4 MG/ML
1 INJECTION, SOLUTION INTRAMUSCULAR; INTRAVENOUS EVERY 4 HOURS PRN
Status: CANCELLED | OUTPATIENT
Start: 2019-01-01

## 2019-01-01 RX ORDER — CHLORDIAZEPOXIDE HYDROCHLORIDE 10 MG/1
10 CAPSULE, GELATIN COATED ORAL 3 TIMES DAILY
Status: DISCONTINUED | OUTPATIENT
Start: 2019-01-01 | End: 2019-01-04

## 2019-01-01 NOTE — PLAN OF CARE
NURSING ADMISSION NOTE      Patient admitted via Wheelchair  Oriented to room. Safety precautions initiated. Bed in low position. Call light in reach. Received report from [facility] in ICU. CIWA protocol initiated. Telemetry in place. SCD's on.   Ro Martinez

## 2019-01-01 NOTE — PLAN OF CARE
ANXIETY    • Will report anxiety at manageable levels Progressing        DRUG ABUSE/DETOX    • Will have no detox symptoms and will verbalize plan for changing drug-related behavior Progressing        GASTROINTESTINAL - ADULT    • Minimal or absence of lillie

## 2019-01-01 NOTE — PROGRESS NOTES
[facility]  Progress Note     Dionicio Larsen Patient Status:  Inpatient    9/15/1960 MRN [id number]   [facility] 4SW-A Attending Jackie Tucker MD   Hosp Day # 5 PCP Huy Hodges MD     Chief Complaint: encephalopathy 25.0   --   19.0*   ALKPHO  80   --   85   --   91   --    --    AST  14*   --   10*   --   12*   --    --    ALT  21   --   17   --   18   --    --    BILT  0.2   --   0.3   --   0.2   --    --    TP  6.0*   --   6.1*   --   6.3*   --    --     < > = va increase activity/up to chair. Gareth ACEVEDO  8:53 AM     Addendum:    Agree with above note. Pt. Seen and examined.     Gen: NAD  CVS: s1s2  Resp: CTA  Abd: soft    -as above  -transfer out of ICU  -wean librium  -DC narcotics    Eric Oswald,

## 2019-01-01 NOTE — PLAN OF CARE
AOX4. VSS. NSR on tele. BP stable. RA. tmax 100.3 pt reports less anxiety tonight. calm and cooperative. C/o of generalized fatigue. Prn bentyl x1 given for abd. Cramps w/ relief. Max CIWA 10. Pt requested ativan x1 2mg total given overnight. Up to commode.  1

## 2019-01-01 NOTE — PROGRESS NOTES
[facility] PULMONARY/CRITICAL CARE    S: Patient much improved MS. Awake and sitting up in a chair. Only 2mg ativan overnight.     Meds:  • metoprolol tartrate  50 mg Oral 2x Daily(Beta Blocker)   • lisinopril  20 mg Oral Daily   • DilTIAZem HCl ER Coated Beads HCT  38.1   < >  30.3*  34.6*  38.0   MCV  72.2*   < >  74.3*  74.6*  73.8*   MCH  22.2*   < >  22.5*  22.4*  22.3*   MCHC  30.7*   < >  30.4*  30.1*  30.3*   RDW  18.6*   < >  17.7*  17.6*  19.1*   NEPRELIM  6.59   --   3.39   --   2.90   WBC  11.3   < -- likely dilution with component of iron def. -started on IV iron  8. IBD:  -mesalamine  9. Hypernatremia:   -resolved   10. FEN:  -general diet  11. Proph:  -hep sub Q  12. Dispo:  -OK for transfer to med/psy floor. Will follow PRN.       Grant Sagastume,

## 2019-01-01 NOTE — PLAN OF CARE
NEUROLOGICAL - ADULT    • Remains free of injury related to seizure activity Adequate for Discharge          ANXIETY    • Will report anxiety at manageable levels Not Progressing          GASTROINTESTINAL - ADULT    • Minimal or absence of nausea and vomit

## 2019-01-01 NOTE — PROGRESS NOTES
[facility]  Nephrology Progress Note    Melva Wheat Attending:  Garrison Key MD       Assessment and Plan:    1) AL- due to dehydration + ACE-I / diuretic + NSAIDS leading to glomerular hypoperfusion and prerenal azotemia / ATN- resolved    2) 111 01/01/2019    CO2 19.0 01/01/2019    GLU 94 01/01/2019    CA 8.6 01/01/2019       Imaging: All imaging studies reviewed.     Meds:     Current Facility-Administered Medications:  Metoprolol Tartrate (LOPRESSOR) tab 50 mg 50 mg Oral 2x Daily(Beta Blocke

## 2019-01-02 ENCOUNTER — APPOINTMENT (OUTPATIENT)
Dept: ULTRASOUND IMAGING | Facility: HOSPITAL | Age: 59
DRG: 917 | End: 2019-01-02
Attending: HOSPITALIST
Payer: COMMERCIAL

## 2019-01-02 LAB
ADENOVIRUS PCR:: NEGATIVE
ANION GAP SERPL CALC-SCNC: 10 MMOL/L (ref 0–18)
ANION GAP SERPL CALC-SCNC: 5 MMOL/L (ref 0–18)
APTT PPP: 33.4 SECONDS (ref 26.1–34.6)
B PERT DNA SPEC QL NAA+PROBE: NEGATIVE
BUN BLD-MCNC: 13 MG/DL (ref 8–20)
BUN BLD-MCNC: 14 MG/DL (ref 8–20)
BUN/CREAT SERPL: 11.3 (ref 10–20)
BUN/CREAT SERPL: 9.9 (ref 10–20)
C PNEUM DNA SPEC QL NAA+PROBE: NEGATIVE
CALCIUM BLD-MCNC: 8.4 MG/DL (ref 8.3–10.3)
CALCIUM BLD-MCNC: 8.4 MG/DL (ref 8.3–10.3)
CHLORIDE SERPL-SCNC: 111 MMOL/L (ref 101–111)
CHLORIDE SERPL-SCNC: 113 MMOL/L (ref 101–111)
CO2 SERPL-SCNC: 20 MMOL/L (ref 22–32)
CO2 SERPL-SCNC: 24 MMOL/L (ref 22–32)
CORONAVIRUS 229E PCR:: NEGATIVE
CORONAVIRUS HKU1 PCR:: NEGATIVE
CORONAVIRUS NL63 PCR:: NEGATIVE
CORONAVIRUS OC43 PCR:: NEGATIVE
CREAT BLD-MCNC: 1.24 MG/DL (ref 0.7–1.3)
CREAT BLD-MCNC: 1.31 MG/DL (ref 0.7–1.3)
ERYTHROCYTE [DISTWIDTH] IN BLOOD BY AUTOMATED COUNT: 20 % (ref 11.5–16)
FLUAV RNA SPEC QL NAA+PROBE: NEGATIVE
FLUBV RNA SPEC QL NAA+PROBE: NEGATIVE
GLUCOSE BLD-MCNC: 107 MG/DL (ref 70–99)
GLUCOSE BLD-MCNC: 123 MG/DL (ref 70–99)
HCT VFR BLD AUTO: 37.3 % (ref 37–53)
HGB BLD-MCNC: 11.3 G/DL (ref 13–17)
MCH RBC QN AUTO: 22.5 PG (ref 27–33.2)
MCHC RBC AUTO-ENTMCNC: 30.3 G/DL (ref 31–37)
MCV RBC AUTO: 74.3 FL (ref 80–99)
METAPNEUMOVIRUS PCR:: NEGATIVE
MYCOPLASMA PNEUMONIA PCR:: NEGATIVE
OSMOLALITY SERPL CALC.SUM OF ELEC: 293 MOSM/KG (ref 275–295)
OSMOLALITY SERPL CALC.SUM OF ELEC: 295 MOSM/KG (ref 275–295)
PARAINFLUENZA 1 PCR:: NEGATIVE
PARAINFLUENZA 2 PCR:: NEGATIVE
PARAINFLUENZA 3 PCR:: NEGATIVE
PARAINFLUENZA 4 PCR:: NEGATIVE
PLATELET # BLD AUTO: 404 10(3)UL (ref 150–450)
POTASSIUM SERPL-SCNC: 3.6 MMOL/L (ref 3.6–5.1)
POTASSIUM SERPL-SCNC: 3.8 MMOL/L (ref 3.6–5.1)
POTASSIUM SERPL-SCNC: 4.1 MMOL/L (ref 3.6–5.1)
RBC # BLD AUTO: 5.02 X10(6)UL (ref 4.3–5.7)
RED CELL DISTRIBUTION WIDTH-SD: 48.7 FL (ref 35.1–46.3)
RHINOVIRUS/ENTERO PCR:: NEGATIVE
RSV RNA SPEC QL NAA+PROBE: POSITIVE
SODIUM SERPL-SCNC: 141 MMOL/L (ref 136–144)
SODIUM SERPL-SCNC: 142 MMOL/L (ref 136–144)
WBC # BLD AUTO: 6.2 X10(3) UL (ref 4–13)

## 2019-01-02 PROCEDURE — 93971 EXTREMITY STUDY: CPT | Performed by: HOSPITALIST

## 2019-01-02 PROCEDURE — 99233 SBSQ HOSP IP/OBS HIGH 50: CPT | Performed by: HOSPITALIST

## 2019-01-02 RX ORDER — HEPARIN SODIUM AND DEXTROSE 10000; 5 [USP'U]/100ML; G/100ML
INJECTION INTRAVENOUS CONTINUOUS
Status: DISCONTINUED | OUTPATIENT
Start: 2019-01-03 | End: 2019-01-03

## 2019-01-02 RX ORDER — HEPARIN SODIUM 5000 [USP'U]/ML
80 INJECTION INTRAVENOUS; SUBCUTANEOUS ONCE
Status: COMPLETED | OUTPATIENT
Start: 2019-01-02 | End: 2019-01-02

## 2019-01-02 RX ORDER — METOCLOPRAMIDE HYDROCHLORIDE 5 MG/ML
10 INJECTION INTRAMUSCULAR; INTRAVENOUS EVERY 8 HOURS PRN
Status: DISCONTINUED | OUTPATIENT
Start: 2019-01-02 | End: 2019-01-04

## 2019-01-02 RX ORDER — POTASSIUM CHLORIDE 20 MEQ/1
40 TABLET, EXTENDED RELEASE ORAL EVERY 4 HOURS
Status: COMPLETED | OUTPATIENT
Start: 2019-01-02 | End: 2019-01-02

## 2019-01-02 RX ORDER — HEPARIN SODIUM AND DEXTROSE 10000; 5 [USP'U]/100ML; G/100ML
18 INJECTION INTRAVENOUS ONCE
Status: COMPLETED | OUTPATIENT
Start: 2019-01-02 | End: 2019-01-02

## 2019-01-02 NOTE — PAYOR COMM NOTE
--------------  CONTINUED STAY REVIEW    Payor: KAREN PPMARINA  Subscriber #:  [id number]  Authorization Number: [id number]         REMAINS IN ICU    NURSIN: AOX4. VSS. NSR on tele. BP stable. RA. tmax 100.3 pt reports less anxiety tonight. calm and co resolved  10. Hypokalemia, resolved     Quality:  · DVT Prophylaxis: heparin  · CODE status: full  · Houser: no  · Central line: no     Estimated date of discharge: TBD     Plan of care discussed with patient, RN . Possibly taper librium?   Transfer to [facility] Given 20 mg Oral Prenrob Moy RN      mesalamine (CANASA) rectal suppository 1,000 mg     Date Action Dose Route User    1/1/2019 2031 Given 1000 mg Rectal Lovina MUNA Jean      mesalamine (DELZICOL) delayed release capsule     Date Action Dose

## 2019-01-02 NOTE — BH PROGRESS NOTE
The pt was seen and he right now does not want to start any cd program until next Monday. He said, he only wants an outpt program and refusing a residential one.   The pt states he feels sick from his respiratory issues and wont be ready to start a program

## 2019-01-02 NOTE — DISCHARGE SUMMARY
[facility] PSYCHIATRIC [place] HOSPITALIST  DISCHARGE SUMMARY     Anjali Lerner Patient Status:  Inpatient    9/15/1960 MRN [id number]   [facility] 3NE-A Attending Riley Gonzalez, DO   Hosp Day # 7 PCP Jody Calderón MD     Date of Admission: 2018 critical care, nephrology was consulted. Patient admitted to heroin abuse for past 3 years up to $100 a day of heroin, smoking marijuana and drinking 1 pint of vodka daily. He requested for us to NOT tell his family. Psychiatry evaluated.   He had episod medications prescribed for you. Read the directions carefully, and ask your doctor or other care provider to review them with you.             CHANGE how you take these medications    allopurinol 300 MG Tabs  Commonly known as:  ZYLOPRIM  TAKE 1 TABLET(300 FOLLOW UP [16] 20 min.  [address] JUAN Montiel MD    Location Instructions:      [facility] Appointments   Date Time Provider [facility]   1/7/2019  3:00 PM MD Joann Nicole

## 2019-01-02 NOTE — PLAN OF CARE
Resumed care of pt. At [address]. Pt. Continues to have a visitor that he owes money to and gave him his debit card to take cash out downstairs. It did not work, so the man came back to room.   Man left, then showed up again talking about money and we asked hi

## 2019-01-02 NOTE — PROGRESS NOTES
[facility] HOSPITALIST  Progress Note     Wilene Pro Patient Status:  Inpatient    9/15/1960 MRN [id number]   [facility] 4SW-A Attending Karlee Jordan MD   Hosp Day # 6 PCP Jessi Sutton MD     Chief Complaint: encephalopathy >  3.3*   < >  4.1  4.0  4.0  3.6   CL  118*   < >  117*   --   115*   --   111  111   CO2  23.0   < >  23.0   --   25.0   --   19.0*  20.0*   ALKPHO  80   --   85   --   91   --    --    --    AST  14*   --   10*   --   12*   --    --    --    ALT  21   - status: full  · [place]: no  · Central line: no    Estimated date of discharge: in next 24hrs? Plan of care discussed with patient. Increase activity/PT eval given unsteady gait. Gareth ACEVEDO  12:09 PM     Addendum:    Agree with above note.   P

## 2019-01-02 NOTE — PHYSICAL THERAPY NOTE
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 Stated feeling achy. Mod I in his bed mobilities and CGA in his transfers and min/CGA in his ambulation with RW as support.  Gt is slightly unsteady patti with increase distance but is able transfers, and gait and currently requires min/CGA in his mobilities and ambulation with RW as support.   The patient is below baseline and would benefit from skilled inpatient PT to address the above deficits to assist patient in returning to prior to leve

## 2019-01-03 VITALS
HEART RATE: 80 BPM | BODY MASS INDEX: 27.18 KG/M2 | RESPIRATION RATE: 17 BRPM | DIASTOLIC BLOOD PRESSURE: 102 MMHG | OXYGEN SATURATION: 95 % | HEIGHT: 70 IN | SYSTOLIC BLOOD PRESSURE: 131 MMHG | TEMPERATURE: 98 F | WEIGHT: 189.88 LBS

## 2019-01-03 LAB
APTT PPP: 106.8 SECONDS (ref 26.1–34.6)
APTT PPP: 186.1 SECONDS (ref 26.1–34.6)
DRUG CONFIRMATION,CANNABINOIDS: 18 NG/ML
PLATELET # BLD AUTO: 393 10(3)UL (ref 150–450)
POTASSIUM SERPL-SCNC: 4.1 MMOL/L (ref 3.6–5.1)

## 2019-01-03 PROCEDURE — 99239 HOSP IP/OBS DSCHRG MGMT >30: CPT | Performed by: HOSPITALIST

## 2019-01-03 RX ORDER — IPRATROPIUM BROMIDE AND ALBUTEROL SULFATE 2.5; .5 MG/3ML; MG/3ML
3 SOLUTION RESPIRATORY (INHALATION) EVERY 4 HOURS PRN
Status: DISCONTINUED | OUTPATIENT
Start: 2019-01-03 | End: 2019-01-04

## 2019-01-03 NOTE — PROGRESS NOTES
Pt Ultrasound + for R subclavian DVT as well as R forearm superficial thrombophlebitis. [facility] hospitalist notified and   Neurovascular checks WDL. Will continue to monitor.

## 2019-01-03 NOTE — PAYOR COMM NOTE
--------------  CONTINUED STAY REVIEW    Payor: KAREN PPMARINA  Subscriber #:  [id number]  Authorization Number: [id number]    Admit date: 12/27/18  Admit time: 2224    Admitting Physician: Jaime Prado MD  Attending Physician:  DO Jodi Mendoza C Dose Route User    1/3/2019 0530 Given 0.5 mg Oral Joceline Maynard RN      mesalamine (DELZICOL) delayed release capsule     Date Action Dose Route User    1/3/2019 0842 Given 1200 mg Oral Kostas Engel RN    1/2/2019 2021 Given 1200 mg Oral Malick Calderón

## 2019-01-03 NOTE — PROGRESS NOTES
[facility] HOSPITALIST  Progress Note     Sarwat Wilson Patient Status:  Inpatient    9/15/1960 MRN [id number]   [facility] 4SW-A Attending Cooper Salgado MD   Hosp Day # 7 PCP Racheal Meeks MD     Chief Complaint: encephalopathy 8.4   --    ALB  3.0*   --   3.0*   --   3.1   --    --    --    --    --    --    NA  148*   < >  146*   --   147*   --   141  141   --   142   --    K  3.2*   < >  3.3*   < >  4.1   < >  4.0  3.6  3.8  4.1  4.1   CL  118*   < >  117*   --   115*   --   1 panel and HIV neg  7. PUD on PPI  8. HTN, monitor  1. Lisinopril/cardizem/metoprolol   9. IBD  1. home mesalamine  10. Anemia, stable  1. Given iron IV  11. Hypernatremia, resolved  12. Hypokalemia, resolved  13. Unsteady gait, improving  1.  PT eval - ok t

## 2019-01-03 NOTE — PHYSICAL THERAPY NOTE
Attempted to see Pt this AM - Pt with positive US for R subclavian DVT - Heparin initiated on 1/2/2019 at 2130. Will HOLD for 24 hours per department protocol.   Will f/u later today if time permits, after all other patients are attempted per tentative maile

## 2019-01-03 NOTE — PROGRESS NOTES
A/O x 4.   Sitter 1:1 bedside for safety  Pt complains of SOB/chest pain  Wheezing noted   MD notified- EKG and duonebs PRN ordered    2L per O2 placed on pt per protocol    PTT redraw is high so heparin drip paused and titrated per protocol    Pt reports r

## 2019-01-04 NOTE — PLAN OF CARE
ANXIETY    • Will report anxiety at manageable levels Adequate for Discharge        DRUG ABUSE/DETOX    • Will have no detox symptoms and will verbalize plan for changing drug-related behavior Adequate for Discharge        GASTROINTESTINAL - ADULT    • Min

## 2019-01-05 LAB
ATRIAL RATE: 90 BPM
P AXIS: 68 DEGREES
P-R INTERVAL: 172 MS
Q-T INTERVAL: 366 MS
QRS DURATION: 70 MS
QTC CALCULATION (BEZET): 447 MS
R AXIS: 19 DEGREES
T AXIS: 9 DEGREES
VENTRICULAR RATE: 90 BPM

## 2019-01-07 NOTE — PROGRESS NOTES
[facility] [address]  [place], [address]  ?  01/07/19  ? Re: Gertrudis Mera  ? To Whom It May Concern:    Gertrudis Mera was admitted to [facility] from 12/27/2018 to 01/03/19.     Please excuse Gertrudis Mera from attending

## 2019-06-17 PROBLEM — K21.00 GASTROESOPHAGEAL REFLUX DISEASE WITH ESOPHAGITIS: Status: ACTIVE | Noted: 2019-06-17

## 2019-08-27 NOTE — PROGRESS NOTES
[facility]  Nephrology Progress Note    Imtiaz Ramires Patient Status:  Inpatient    9/15/1960 MRN [id number]   [facility] 4SW-A Attending Rolanda Meade MD   Hosp Day # 3 PCP Jacinto Benjamin MD       SUBJECTIVE:  [place] but Likely due to acute pancreatitis. No history of diabetes.  Continue to monitor   5*  4*   CREATSERUM  2.80*  2.50*  2.05*  1.76*  1.48*   CA  7.8*  7.6*  7.9*  7.7*  8.3   MG  2.5  2.6*   --   2.0   --    PHOS   --    --    --   3.2   --    GLU  121*  105*  96  98  89       Recent Labs   Lab  12/27/18   1754  12/28/18   0443  12/30/18 azotemia in setting of ACE/diuretic. Renal fxn has steadily improved with IVF and will cont. Expect ongoing recovery    #2. Hypernatremia- due to free water deficit. Cont hypotonic IVF and follow. Encourage water intake    #3.  Encephalopathy- isopropyl

## (undated) NOTE — ED AVS SNAPSHOT
Zaheer Rojas   MRN: [id number]    Department:  [facility] in [place]   Date of Visit:  3/24/2018           Disclosure     Insurance plans vary and the physician(s) referred by the ER may not be covered by your plan.  Please contac tell this physician (or your personal doctor if your instructions are to return to your personal doctor) about any new or lasting problems. The primary care or specialist physician will see patients referred from the [facility] Emergency Department.  Rob Pena